# Patient Record
Sex: FEMALE | Race: WHITE | NOT HISPANIC OR LATINO | Employment: PART TIME | URBAN - METROPOLITAN AREA
[De-identification: names, ages, dates, MRNs, and addresses within clinical notes are randomized per-mention and may not be internally consistent; named-entity substitution may affect disease eponyms.]

---

## 2024-04-15 ENCOUNTER — TELEPHONE (OUTPATIENT)
Dept: PSYCHIATRY | Facility: CLINIC | Age: 24
End: 2024-04-15
Payer: COMMERCIAL

## 2024-04-15 NOTE — TELEPHONE ENCOUNTER
"PSYCHIATRY CLINIC PHONE INTAKE     SERVICES REQUESTED / INTERESTED IN          Med Management    Presenting Problem and Brief History                              What would you like to be seen for? (brief description):    Patient is trans, would like a trans competent medication provider, asked for Jessica Hi.     Patient has seen a therapist before last time was in March 2024, through [employer] service. Finished with current series, will resume with same provider again in August    Third Party service provides medication - [facility] for HRT (no other current medications)    Patient wants to look into diagnosis and treatment for ADHD (\"whole family has ADHD\"), both previous therapists could not make diagnosis, but say patient probably has depression and general anxiety disorder.     Have you received a mental health diagnosis? No   Which one (s): na  Is there any history of developmental delay?  No   Are you currently seeing a mental health provider?  No            Who / month last seen:  -  Do you have mental health records elsewhere?   unsure  Will you sign a release so we can obtain them?  Yes    Have you ever been hospitalized for psychiatric reasons?  No  Describe:  na    Do you have current thoughts of self-harm?  No    Do you currently have thoughts of harming others?  No    Do you have any safety concerns? No   If yes to these, offer to reach out to a  for follow up.      Substance Use History     Do you have any history of alcohol / illicit drug use?  Yes  Describe:  marijuana use, very occasional  Have you ever received treatment for this?  No    Describe:  na     Social History     Who is the patient's a guardian?   self     Name / number: self  Have you had an ACT team in last 12 months?  No  Describe: na   OK to leave a detailed voicemail?  Yes        Medical/ Surgical History                                 There is no problem list on file for this patient.         Medications         "     Have you taken >3 psychiatric medications in your past?  n  Do you currently take 5 or more medications, including prescriptions, supplements, and other over the counter products?  n    If YES to at least one of these questions:   As part of your evaluation in our clinic, we have specially trained pharmacists as part of your care team. Your provider would like for you to meet with one of our pharmacists to review your current and past medications, ensure your med list is up to date, and queue up any questions or concerns you have about medications. They will review all of your medications, not just for mental health, to help ensure you know what you re taking and that everything is working together.     Please schedule patient in [facility] PSYCHIATRY (Arelis Qureshi or Trista Chacon) for 60m MTM in any green space as virtual (video), telephone, or in person (designated in person days per Epic templates).  -Appt notes can say  Psych eval on xx/xx   -Route telephone encounter to the pharmacist who will be seeing the patient.  If patient has questions about insurance coverage or billing, please still schedule the visit and refer them to call the MT coordinators at [phone].    No current outpatient medications on file.         DISPOSITION      Phone screen completed with patient. Scheduled for AGE w Jessica Hi 7/30/24. Emailed new patient packet    Complex  Rylee Parra

## 2024-04-17 ASSESSMENT — PATIENT HEALTH QUESTIONNAIRE - PHQ9
SUM OF ALL RESPONSES TO PHQ QUESTIONS 1-9: 14
SUM OF ALL RESPONSES TO PHQ QUESTIONS 1-9: 14
10. IF YOU CHECKED OFF ANY PROBLEMS, HOW DIFFICULT HAVE THESE PROBLEMS MADE IT FOR YOU TO DO YOUR WORK, TAKE CARE OF THINGS AT HOME, OR GET ALONG WITH OTHER PEOPLE: VERY DIFFICULT

## 2024-04-18 ENCOUNTER — VIRTUAL VISIT (OUTPATIENT)
Dept: PSYCHIATRY | Facility: CLINIC | Age: 24
End: 2024-04-18
Attending: NURSE PRACTITIONER
Payer: COMMERCIAL

## 2024-04-18 VITALS — HEIGHT: 66 IN

## 2024-04-18 DIAGNOSIS — F43.21 ADJUSTMENT DISORDER WITH DEPRESSED MOOD: ICD-10-CM

## 2024-04-18 DIAGNOSIS — R45.89 ANXIETY ABOUT HEALTH: Primary | ICD-10-CM

## 2024-04-18 PROCEDURE — 99205 OFFICE O/P NEW HI 60 MIN: CPT | Mod: 95 | Performed by: NURSE PRACTITIONER

## 2024-04-18 PROCEDURE — G2211 COMPLEX E/M VISIT ADD ON: HCPCS | Mod: 95 | Performed by: NURSE PRACTITIONER

## 2024-04-18 PROCEDURE — 99417 PROLNG OP E/M EACH 15 MIN: CPT | Mod: 95 | Performed by: NURSE PRACTITIONER

## 2024-04-18 PROCEDURE — 96127 BRIEF EMOTIONAL/BEHAV ASSMT: CPT | Mod: 95 | Performed by: NURSE PRACTITIONER

## 2024-04-18 RX ORDER — PROGESTERONE 100 MG/1
100 CAPSULE ORAL DAILY
COMMUNITY

## 2024-04-18 RX ORDER — ESTRADIOL 2 MG/1
2 TABLET ORAL 2 TIMES DAILY
COMMUNITY

## 2024-04-18 RX ORDER — SPIRONOLACTONE 25 MG/1
25 TABLET ORAL 2 TIMES DAILY
COMMUNITY

## 2024-04-18 ASSESSMENT — ANXIETY QUESTIONNAIRES
8. IF YOU CHECKED OFF ANY PROBLEMS, HOW DIFFICULT HAVE THESE MADE IT FOR YOU TO DO YOUR WORK, TAKE CARE OF THINGS AT HOME, OR GET ALONG WITH OTHER PEOPLE?: VERY DIFFICULT
GAD7 TOTAL SCORE: 13
4. TROUBLE RELAXING: SEVERAL DAYS
7. FEELING AFRAID AS IF SOMETHING AWFUL MIGHT HAPPEN: NEARLY EVERY DAY
GAD7 TOTAL SCORE: 13
3. WORRYING TOO MUCH ABOUT DIFFERENT THINGS: MORE THAN HALF THE DAYS
IF YOU CHECKED OFF ANY PROBLEMS ON THIS QUESTIONNAIRE, HOW DIFFICULT HAVE THESE PROBLEMS MADE IT FOR YOU TO DO YOUR WORK, TAKE CARE OF THINGS AT HOME, OR GET ALONG WITH OTHER PEOPLE: VERY DIFFICULT
GAD7 TOTAL SCORE: 13
7. FEELING AFRAID AS IF SOMETHING AWFUL MIGHT HAPPEN: NEARLY EVERY DAY
5. BEING SO RESTLESS THAT IT IS HARD TO SIT STILL: SEVERAL DAYS
1. FEELING NERVOUS, ANXIOUS, OR ON EDGE: MORE THAN HALF THE DAYS
2. NOT BEING ABLE TO STOP OR CONTROL WORRYING: MORE THAN HALF THE DAYS
6. BECOMING EASILY ANNOYED OR IRRITABLE: MORE THAN HALF THE DAYS

## 2024-04-18 ASSESSMENT — PAIN SCALES - GENERAL: PAINLEVEL: NO PAIN (0)

## 2024-04-18 NOTE — NURSING NOTE
Is the patient currently in the state of [place]? YES    Visit mode:VIDEO    If the visit is dropped, the patient can be reconnected by: VIDEO VISIT: Send to e-mail at: [email or web address]    Will anyone else be joining the visit? NO  (If patient encounters technical issues they should call [phone]    How would you like to obtain your AVS? MyChart    Are changes needed to the allergy or medication list? No    Are refills needed on medications prescribed by this physician? NO    Reason for visit: Consult    Nasrin SAMSON

## 2024-04-18 NOTE — PATIENT INSTRUCTIONS
-No medication was prescribed today. Recommend checking blood pressure and record them 2 times a week for about 2 weeks.    ADHD testing  -[facility] [email or web address] [email or web address] they tend to have long waitlist for comprehensive testing, but just for ADHD testing, they tend to have shorter waitlist.  -[facility] [place] [facility] [email or web address]  -[facility] [email or web address]  -[facility] [email or web address]    -May return to [facility] if diagnosed with ADHD and wants medication trial.    **For crisis resources, please see the information at the end of this document**   Patient Education    Thank you for coming to the [facility] [place] [facility].     Lab Testing:  If you had lab testing today and your results are reassuring or normal they will be mailed to you or sent through Xamplified within 7 days. If the lab tests need quick action we will call you with the results. The phone number we will call with results is # [phone]. If this is not the best number please call our clinic and change the number.     Medication Refills:  If you need any refills please call your pharmacy and they will contact us. Our fax number for refills is [phone].   Three business days of notice are needed for general medication refill requests.   Five business days of notice are needed for controlled substance refill requests.   If you need to change to a different pharmacy, please contact the new pharmacy directly. The new pharmacy will help you get your medications transferred.     Contact Us:  Please call [phone] during business hours (8-5:00 M-F).   If you have medication related questions after clinic hours, or on the weekend, please call [phone].     Financial Assistance [phone]   Medical  Records [phone]       MENTAL HEALTH CRISIS RESOURCES:  For a emergency help, please call 911 or go to the nearest Emergency Department.     Emergency Walk-In Options:   [facility] Unit @ [facility] ([place]): [phone] - Specialized mental health emergency area designed to be calming  [facility] ([place]): [phone]  [facility] ([place]): [phone]  [facility] ([place]): [phone]    [facility] Crisis Information:   [place]: [phone]  [place]: [phone]  [place] ([place]) - Adult: [phone]     Child: [phone]  [place] - Adult: [phone]     Child: [phone]  [place]: [phone]  List of all [facility] resources:   [email or web address]/dhs/people-we-serve/adults/health-care/mental-health/resources/crisis-contacts.jsp    National Crisis Information:   Crisis Text Line: Text  [place]  to 154927  Suicide & Crisis Lifeline: 988  National Suicide Prevention Lifeline: 0-146-966-TALK (1-529.110.2408)       For online chat options, visit https://suicidepreventionlifeline.org/chat/  Poison Control Center: 1-513.368.2724  Trans Lifeline: 1-908.734.4363 - Hotline for transgender people of all ages  The [place] Project: 5-919-050-8401 - Hotline for LGBT youth     For Non-Emergency Support:   Fast Tracker: Mental Health & Substance Use Disorder Resources -   https://www.[employer].org/
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
## 2024-05-19 ENCOUNTER — HEALTH MAINTENANCE LETTER (OUTPATIENT)
Age: 24
End: 2024-05-19

## 2025-06-08 ENCOUNTER — HEALTH MAINTENANCE LETTER (OUTPATIENT)
Age: 25
End: 2025-06-08